# Patient Record
Sex: FEMALE | Race: WHITE | NOT HISPANIC OR LATINO | URBAN - METROPOLITAN AREA
[De-identification: names, ages, dates, MRNs, and addresses within clinical notes are randomized per-mention and may not be internally consistent; named-entity substitution may affect disease eponyms.]

---
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
## 2020-05-26 ENCOUNTER — SEE NOTE (OUTPATIENT)
Dept: URBAN - METROPOLITAN AREA CLINIC 32 | Facility: CLINIC | Age: 20
Setting detail: DERMATOLOGY
End: 2020-05-26

## 2020-05-26 DIAGNOSIS — Z41.9 ENCOUNTER FOR PROCEDURE FOR PURPOSES OTHER THAN REMEDYING HEALTH STATE, UNSPECIFIED: ICD-10-CM

## 2020-05-26 PROCEDURE — 99213 OFFICE O/P EST LOW 20 MIN: CPT

## 2020-12-11 ENCOUNTER — OFFICE VISIT (OUTPATIENT)
Dept: URBAN - METROPOLITAN AREA CLINIC 96 | Facility: CLINIC | Age: 20
End: 2020-12-11
Payer: COMMERCIAL

## 2020-12-11 ENCOUNTER — DASHBOARD ENCOUNTERS (OUTPATIENT)
Age: 20
End: 2020-12-11

## 2020-12-11 ENCOUNTER — WEB ENCOUNTER (OUTPATIENT)
Dept: URBAN - METROPOLITAN AREA CLINIC 96 | Facility: CLINIC | Age: 20
End: 2020-12-11

## 2020-12-11 DIAGNOSIS — K59.01 SLOW TRANSIT CONSTIPATION: ICD-10-CM

## 2020-12-11 DIAGNOSIS — R14.0 BLOATING: ICD-10-CM

## 2020-12-11 PROBLEM — 35298007: Status: ACTIVE | Noted: 2020-12-11

## 2020-12-11 PROCEDURE — G8482 FLU IMMUNIZE ORDER/ADMIN: HCPCS | Performed by: INTERNAL MEDICINE

## 2020-12-11 PROCEDURE — G8420 CALC BMI NORM PARAMETERS: HCPCS | Performed by: INTERNAL MEDICINE

## 2020-12-11 PROCEDURE — 1036F TOBACCO NON-USER: CPT | Performed by: INTERNAL MEDICINE

## 2020-12-11 PROCEDURE — G9903 PT SCRN TBCO ID AS NON USER: HCPCS | Performed by: INTERNAL MEDICINE

## 2020-12-11 PROCEDURE — 99213 OFFICE O/P EST LOW 20 MIN: CPT | Performed by: INTERNAL MEDICINE

## 2020-12-11 PROCEDURE — G8427 DOCREV CUR MEDS BY ELIG CLIN: HCPCS | Performed by: INTERNAL MEDICINE

## 2020-12-11 NOTE — HPI-TODAY'S VISIT:
here for constiption affecting her life she spends an hour and has little BMs/hard/pellets no blood no mucus saw dr. sarmiento in 2018 for similar sx but she does not remember takes miralax and this helps but takes a fdew days to kick in went to see student health doc [facility] and labs were done, normal thyroid, normal labs taht she wshows me quickly on phone. gets HA and migraines from her constipation seen dr. long in past for idiopathic arthritis

## 2020-12-13 LAB
DEAMIDATED GLIADIN ABS, IGA: 7
DEAMIDATED GLIADIN ABS, IGG: 3
ENDOMYSIAL ANTIBODY IGA: NEGATIVE
IMMUNOGLOBULIN A, QN, SERUM: 280
T-TRANSGLUTAMINASE (TTG) IGA: <2
T-TRANSGLUTAMINASE (TTG) IGG: 4